# Patient Record
Sex: FEMALE | Race: WHITE | NOT HISPANIC OR LATINO | URBAN - METROPOLITAN AREA
[De-identification: names, ages, dates, MRNs, and addresses within clinical notes are randomized per-mention and may not be internally consistent; named-entity substitution may affect disease eponyms.]

---

## 2018-02-05 ENCOUNTER — MYC MEDICAL ADVICE (OUTPATIENT)
Dept: FAMILY MEDICINE | Facility: CLINIC | Age: 33
End: 2018-02-05

## 2018-02-06 NOTE — TELEPHONE ENCOUNTER
Please see 117go message. Please advise. Thank you.  Cammy Lopez RN, BSN  [facility]

## 2018-12-05 ENCOUNTER — MYC MEDICAL ADVICE (OUTPATIENT)
Dept: FAMILY MEDICINE | Facility: CLINIC | Age: 33
End: 2018-12-05

## 2018-12-06 NOTE — TELEPHONE ENCOUNTER
Please see Shoppilot message chain. OK to order blood type? Please advise. Thank you.  Cammy Lopez RN, BSN  [facility]

## 2019-01-09 NOTE — TELEPHONE ENCOUNTER
Patient called to cancel appointment for 1/10/19, stated has to work. Appointment is for 1:30pm, just short of 24 hour notice. Patient will call back to schedule future appointment after checking work schedule. Wimba message/reply sent. Jessica Chauhan R.N.

## 2019-11-06 ENCOUNTER — HEALTH MAINTENANCE LETTER (OUTPATIENT)
Age: 34
End: 2019-11-06

## 2020-02-26 ENCOUNTER — OFFICE VISIT (OUTPATIENT)
Dept: FAMILY MEDICINE | Facility: CLINIC | Age: 35
End: 2020-02-26
Payer: COMMERCIAL

## 2020-02-26 VITALS
OXYGEN SATURATION: 97 % | DIASTOLIC BLOOD PRESSURE: 70 MMHG | WEIGHT: 208 LBS | SYSTOLIC BLOOD PRESSURE: 116 MMHG | BODY MASS INDEX: 34.66 KG/M2 | TEMPERATURE: 98.8 F | HEART RATE: 99 BPM | HEIGHT: 65 IN

## 2020-02-26 DIAGNOSIS — E66.09 CLASS 2 OBESITY DUE TO EXCESS CALORIES WITHOUT SERIOUS COMORBIDITY WITH BODY MASS INDEX (BMI) OF 35.0 TO 35.9 IN ADULT: ICD-10-CM

## 2020-02-26 DIAGNOSIS — F32.5 DEPRESSION, MAJOR, IN REMISSION (H): ICD-10-CM

## 2020-02-26 DIAGNOSIS — Z00.00 ROUTINE GENERAL MEDICAL EXAMINATION AT A HEALTH CARE FACILITY: Primary | ICD-10-CM

## 2020-02-26 DIAGNOSIS — E66.812 CLASS 2 OBESITY DUE TO EXCESS CALORIES WITHOUT SERIOUS COMORBIDITY WITH BODY MASS INDEX (BMI) OF 35.0 TO 35.9 IN ADULT: ICD-10-CM

## 2020-02-26 PROCEDURE — 90471 IMMUNIZATION ADMIN: CPT | Performed by: FAMILY MEDICINE

## 2020-02-26 PROCEDURE — 99385 PREV VISIT NEW AGE 18-39: CPT | Mod: 25 | Performed by: FAMILY MEDICINE

## 2020-02-26 PROCEDURE — 90715 TDAP VACCINE 7 YRS/> IM: CPT | Performed by: FAMILY MEDICINE

## 2020-02-26 RX ORDER — MULTIPLE VITAMINS W/ MINERALS TAB 9MG-400MCG
1 TAB ORAL DAILY
COMMUNITY

## 2020-02-26 ASSESSMENT — ANXIETY QUESTIONNAIRES
2. NOT BEING ABLE TO STOP OR CONTROL WORRYING: SEVERAL DAYS
6. BECOMING EASILY ANNOYED OR IRRITABLE: NOT AT ALL
3. WORRYING TOO MUCH ABOUT DIFFERENT THINGS: NOT AT ALL
5. BEING SO RESTLESS THAT IT IS HARD TO SIT STILL: NOT AT ALL
1. FEELING NERVOUS, ANXIOUS, OR ON EDGE: NOT AT ALL
GAD7 TOTAL SCORE: 1
IF YOU CHECKED OFF ANY PROBLEMS ON THIS QUESTIONNAIRE, HOW DIFFICULT HAVE THESE PROBLEMS MADE IT FOR YOU TO DO YOUR WORK, TAKE CARE OF THINGS AT HOME, OR GET ALONG WITH OTHER PEOPLE: NOT DIFFICULT AT ALL
7. FEELING AFRAID AS IF SOMETHING AWFUL MIGHT HAPPEN: NOT AT ALL

## 2020-02-26 ASSESSMENT — PATIENT HEALTH QUESTIONNAIRE - PHQ9
5. POOR APPETITE OR OVEREATING: NOT AT ALL
SUM OF ALL RESPONSES TO PHQ QUESTIONS 1-9: 3

## 2020-02-26 ASSESSMENT — MIFFLIN-ST. JEOR: SCORE: 1626.42

## 2020-02-26 NOTE — PROGRESS NOTES
" SUBJECTIVE:   CC: Cammy Patino is an 34 year old woman who presents for preventive health visit.     Healthy Habits:    Do you get at least three servings of calcium containing foods daily (dairy, green leafy vegetables, etc.)? yes    Amount of exercise or daily activities, outside of work: 2-3 day(s) per week    Problems taking medications regularly not applicable    Medication side effects: No    Have you had an eye exam in the past two years? yes    Do you see a dentist twice per year? yes    Do you have sleep apnea, excessive snoring or daytime drowsiness?yes       Depression Followup    How are you doing with your depression since your last visit? Improved - off of medication    Are you having other symptoms that might be associated with depression? No    Have you had a significant life event?  No     Are you feeling anxious or having panic attacks?   No    Do you have any concerns with your use of alcohol or other drugs? No    Social History     Tobacco Use     Smoking status: Never Smoker     Smokeless tobacco: Never Used   Substance Use Topics     Alcohol use: Yes     Comment: \"SLIM\"     Drug use: No     PHQ 2/26/2020   PHQ-9 Total Score 3   Q9: Thoughts of better off dead/self-harm past 2 weeks Not at all     KYLEE-7 SCORE 11/21/2013 2/26/2020   Total Score 14 -   Total Score - 1     Last PHQ-9 2/26/2020   1.  Little interest or pleasure in doing things 0   2.  Feeling down, depressed, or hopeless 0   3.  Trouble falling or staying asleep, or sleeping too much 1   4.  Feeling tired or having little energy 1   5.  Poor appetite or overeating 1   6.  Feeling bad about yourself 0   7.  Trouble concentrating 0   8.  Moving slowly or restless 0   Q9: Thoughts of better off dead/self-harm past 2 weeks 0   PHQ-9 Total Score 3   Difficulty at work, home, or with people Not difficult at all     KYLEE-7  2/26/2020   1. Feeling nervous, anxious, or on edge 0   2. Not being able to stop or control worrying 1   3. " "Worrying too much about different things 0   4. Trouble relaxing 0   5. Being so restless that it is hard to sit still 0   6. Becoming easily annoyed or irritable 0   7. Feeling afraid, as if something awful might happen 0   KYLEE-7 Total Score 1   If you checked any problems, how difficult have they made it for you to do your work, take care of things at home, or get along with other people? Not difficult at all         Suicide Assessment Five-step Evaluation and Treatment (SAFE-T)      Today's PHQ-2 Score:   PHQ-2 ( 1999 Pfizer) 2/26/2020 2/7/2013   Q1: Little interest or pleasure in doing things 0 0   Q2: Feeling down, depressed or hopeless 0 0   PHQ-2 Score 0 0       Abuse: Current or Past(Physical, Sexual or Emotional)- NO  Do you feel safe in your environment? YES        Social History     Tobacco Use     Smoking status: Never Smoker     Smokeless tobacco: Never Used   Substance Use Topics     Alcohol use: Yes     Comment: \"SLIM\"     If you drink alcohol do you typically have >3 drinks per day or >7 drinks per week? No                     Reviewed orders with patient.  Reviewed health maintenance and updated orders accordingly - Yes  Lab work is in process  Labs reviewed in [facility]    Mammogram not appropriate for this patient based on age.    Pertinent mammograms are reviewed under the imaging tab.  History of abnormal Pap smear: NO - age 30-65 PAP every 5 years with negative HPV co-testing recommended  PAP / HPV Latest Ref Rng & Units 6/21/2016 2/7/2013 12/30/2010   PAP - NIL NIL NIL   HPV 16 DNA NEG Negative - -   HPV 18 DNA NEG Negative - -   OTHER HR HPV NEG Negative - -     Reviewed and updated as needed this visit by clinical staff  Tobacco  Allergies  Meds  Problems  Soc Hx        Reviewed and updated as needed this visit by Provider  Problems            ROS:  CONSTITUTIONAL: NEGATIVE for fever, chills, change in weight  INTEGUMENTARU/SKIN: NEGATIVE for worrisome rashes, moles or lesions  EYES: " "NEGATIVE for vision changes or irritation  ENT: NEGATIVE for ear, mouth and throat problems  RESP: NEGATIVE for significant cough or SOB  BREAST: NEGATIVE for masses, tenderness or discharge  CV: NEGATIVE for chest pain, palpitations or peripheral edema  GI: NEGATIVE for nausea, abdominal pain, heartburn, or change in bowel habits  : NEGATIVE for unusual urinary or vaginal symptoms. Periods are regular.  MUSCULOSKELETAL: NEGATIVE for significant arthralgias or myalgia  NEURO: NEGATIVE for weakness, dizziness or paresthesias  PSYCHIATRIC: NEGATIVE for changes in mood or affect    OBJECTIVE:   /70   Pulse 99   Temp 98.8  F (37.1  C) (Oral)   Ht 1.638 m (5' 4.5\")   Wt 94.3 kg (208 lb)   SpO2 97%   BMI 35.15 kg/m    EXAM:  GENERAL: healthy, alert and no distress  EYES: Eyes grossly normal to inspection, PERRL and conjunctivae and sclerae normal  HENT: ear canals and TM's normal, nose and mouth without ulcers or lesions  NECK: no adenopathy, no asymmetry, masses, or scars and thyroid normal to palpation  RESP: lungs clear to auscultation - no rales, rhonchi or wheezes  CV: regular rate and rhythm, normal S1 S2, no S3 or S4, no murmur, click or rub, no peripheral edema and peripheral pulses strong  ABDOMEN: soft, nontender, no hepatosplenomegaly, no masses and bowel sounds normal  MS: no gross musculoskeletal defects noted, no edema  SKIN: no suspicious lesions or rashes  NEURO: Normal strength and tone, mentation intact and speech normal  PSYCH: mentation appears normal, affect normal/bright    Diagnostic Test Results:  Labs reviewed in Epic    ASSESSMENT/PLAN:   1. Routine general medical examination at a health care facility  Updated Tdap.  Declines flu shot.  Haven't check a lipid panel in 7 years so will check this today.  Emphasized importance of healthy lifestyle including regular exercise and lower fat & lower sodium diet.   - TDAP, IM (10 - 64 YRS) - Adacel  - Lipid panel reflex to direct LDL " "Non-fasting    2. Depression, major, in remission (H)  This is stable.  Continue to follow clinically.    3. Class 2 obesity due to excess calories without serious comorbidity with body mass index (BMI) of 35.0 to 35.9 in adult  Given obesity, will screen for diabetes.    - Hemoglobin A1c    COUNSELING:   Reviewed preventive health counseling, as reflected in patient instructions       Immunizations    Declined: Influenza due to Concerns about side effects/safety            Estimated body mass index is 35.15 kg/m  as calculated from the following:    Height as of this encounter: 1.638 m (5' 4.5\").    Weight as of this encounter: 94.3 kg (208 lb).    Weight management plan: Discussed healthy diet and exercise guidelines     reports that she has never smoked. She has never used smokeless tobacco.      Counseling Resources:  ATP IV Guidelines  Pooled Cohorts Equation Calculator  Breast Cancer Risk Calculator  FRAX Risk Assessment  ICSI Preventive Guidelines  Dietary Guidelines for Americans, 2010  USDA's MyPlate  ASA Prophylaxis  Lung CA Screening    Yeison Givens Jr, MD  [facility]  "

## 2020-02-27 ASSESSMENT — ANXIETY QUESTIONNAIRES: GAD7 TOTAL SCORE: 1

## 2020-11-29 ENCOUNTER — HEALTH MAINTENANCE LETTER (OUTPATIENT)
Age: 35
End: 2020-11-29

## 2021-04-10 ENCOUNTER — HEALTH MAINTENANCE LETTER (OUTPATIENT)
Age: 36
End: 2021-04-10

## 2021-09-19 ENCOUNTER — HEALTH MAINTENANCE LETTER (OUTPATIENT)
Age: 36
End: 2021-09-19

## 2022-04-12 ASSESSMENT — ENCOUNTER SYMPTOMS
HEMATOCHEZIA: 0
HEARTBURN: 0
FEVER: 0
FREQUENCY: 0
WEAKNESS: 0
SHORTNESS OF BREATH: 0
SORE THROAT: 0
ARTHRALGIAS: 0
MYALGIAS: 0
ABDOMINAL PAIN: 0
JOINT SWELLING: 0
DIZZINESS: 0
NERVOUS/ANXIOUS: 1
EYE PAIN: 0
CONSTIPATION: 0
DYSURIA: 0
DIARRHEA: 0
PALPITATIONS: 0
HEADACHES: 0
BREAST MASS: 0
COUGH: 0
PARESTHESIAS: 0
NAUSEA: 0
CHILLS: 0
HEMATURIA: 0

## 2022-04-13 ENCOUNTER — OFFICE VISIT (OUTPATIENT)
Dept: FAMILY MEDICINE | Facility: CLINIC | Age: 37
End: 2022-04-13
Payer: COMMERCIAL

## 2022-04-13 VITALS
RESPIRATION RATE: 12 BRPM | BODY MASS INDEX: 35.49 KG/M2 | HEIGHT: 65 IN | HEART RATE: 97 BPM | OXYGEN SATURATION: 98 % | WEIGHT: 213 LBS | DIASTOLIC BLOOD PRESSURE: 78 MMHG | TEMPERATURE: 98.3 F | SYSTOLIC BLOOD PRESSURE: 118 MMHG

## 2022-04-13 DIAGNOSIS — Z11.59 NEED FOR HEPATITIS C SCREENING TEST: ICD-10-CM

## 2022-04-13 DIAGNOSIS — Z00.00 ENCOUNTER FOR ROUTINE ADULT HEALTH EXAMINATION WITHOUT ABNORMAL FINDINGS: Primary | ICD-10-CM

## 2022-04-13 DIAGNOSIS — E66.01 MORBID OBESITY (H): ICD-10-CM

## 2022-04-13 DIAGNOSIS — N92.6 IRREGULAR MENSES: ICD-10-CM

## 2022-04-13 DIAGNOSIS — B96.89 BACTERIAL VAGINOSIS: ICD-10-CM

## 2022-04-13 DIAGNOSIS — E78.2 ELEVATED TRIGLYCERIDES WITH HIGH CHOLESTEROL: ICD-10-CM

## 2022-04-13 DIAGNOSIS — F32.5 DEPRESSION, MAJOR, IN REMISSION (H): ICD-10-CM

## 2022-04-13 DIAGNOSIS — N76.0 BACTERIAL VAGINOSIS: ICD-10-CM

## 2022-04-13 DIAGNOSIS — Z12.4 CERVICAL CANCER SCREENING: ICD-10-CM

## 2022-04-13 PROBLEM — E66.09 CLASS 2 OBESITY DUE TO EXCESS CALORIES WITHOUT SERIOUS COMORBIDITY WITH BODY MASS INDEX (BMI) OF 35.0 TO 35.9 IN ADULT: Status: RESOLVED | Noted: 2020-02-26 | Resolved: 2022-04-13

## 2022-04-13 PROBLEM — E66.812 CLASS 2 OBESITY DUE TO EXCESS CALORIES WITHOUT SERIOUS COMORBIDITY WITH BODY MASS INDEX (BMI) OF 35.0 TO 35.9 IN ADULT: Status: RESOLVED | Noted: 2020-02-26 | Resolved: 2022-04-13

## 2022-04-13 LAB
BASOPHILS # BLD AUTO: 0.1 10E3/UL (ref 0–0.2)
BASOPHILS NFR BLD AUTO: 1 %
CHOLEST SERPL-MCNC: 235 MG/DL
CLUE CELLS: PRESENT
EOSINOPHIL # BLD AUTO: 0.6 10E3/UL (ref 0–0.7)
EOSINOPHIL NFR BLD AUTO: 6 %
ERYTHROCYTE [DISTWIDTH] IN BLOOD BY AUTOMATED COUNT: 12.3 % (ref 10–15)
FASTING STATUS PATIENT QL REPORTED: YES
FASTING STATUS PATIENT QL REPORTED: YES
FSH SERPL-ACNC: 17 IU/L
GLUCOSE BLD-MCNC: 89 MG/DL (ref 70–99)
HCT VFR BLD AUTO: 42.4 % (ref 35–47)
HDLC SERPL-MCNC: 34 MG/DL
HGB BLD-MCNC: 14.1 G/DL (ref 11.7–15.7)
IMM GRANULOCYTES # BLD: 0 10E3/UL
IMM GRANULOCYTES NFR BLD: 0 %
LDLC SERPL CALC-MCNC: 137 MG/DL
LH SERPL-ACNC: 25.2 IU/L
LYMPHOCYTES # BLD AUTO: 2.8 10E3/UL (ref 0.8–5.3)
LYMPHOCYTES NFR BLD AUTO: 30 %
MCH RBC QN AUTO: 29.4 PG (ref 26.5–33)
MCHC RBC AUTO-ENTMCNC: 33.3 G/DL (ref 31.5–36.5)
MCV RBC AUTO: 88 FL (ref 78–100)
MONOCYTES # BLD AUTO: 0.6 10E3/UL (ref 0–1.3)
MONOCYTES NFR BLD AUTO: 6 %
NEUTROPHILS # BLD AUTO: 5.5 10E3/UL (ref 1.6–8.3)
NEUTROPHILS NFR BLD AUTO: 58 %
NONHDLC SERPL-MCNC: 201 MG/DL
PLATELET # BLD AUTO: 303 10E3/UL (ref 150–450)
RBC # BLD AUTO: 4.8 10E6/UL (ref 3.8–5.2)
TRICHOMONAS, WET PREP: ABNORMAL
TRIGL SERPL-MCNC: 321 MG/DL
TSH SERPL DL<=0.005 MIU/L-ACNC: 1.67 MU/L (ref 0.4–4)
WBC # BLD AUTO: 9.6 10E3/UL (ref 4–11)
WBC'S/HIGH POWER FIELD, WET PREP: ABNORMAL
YEAST, WET PREP: ABNORMAL

## 2022-04-13 PROCEDURE — 87210 SMEAR WET MOUNT SALINE/INK: CPT | Performed by: FAMILY MEDICINE

## 2022-04-13 PROCEDURE — 80076 HEPATIC FUNCTION PANEL: CPT | Performed by: FAMILY MEDICINE

## 2022-04-13 PROCEDURE — 86803 HEPATITIS C AB TEST: CPT | Performed by: FAMILY MEDICINE

## 2022-04-13 PROCEDURE — 99214 OFFICE O/P EST MOD 30 MIN: CPT | Mod: 25 | Performed by: FAMILY MEDICINE

## 2022-04-13 PROCEDURE — 80061 LIPID PANEL: CPT | Performed by: FAMILY MEDICINE

## 2022-04-13 PROCEDURE — 85025 COMPLETE CBC W/AUTO DIFF WBC: CPT | Performed by: FAMILY MEDICINE

## 2022-04-13 PROCEDURE — 83001 ASSAY OF GONADOTROPIN (FSH): CPT | Performed by: FAMILY MEDICINE

## 2022-04-13 PROCEDURE — 82947 ASSAY GLUCOSE BLOOD QUANT: CPT | Performed by: FAMILY MEDICINE

## 2022-04-13 PROCEDURE — 83002 ASSAY OF GONADOTROPIN (LH): CPT | Performed by: FAMILY MEDICINE

## 2022-04-13 PROCEDURE — 99395 PREV VISIT EST AGE 18-39: CPT | Performed by: FAMILY MEDICINE

## 2022-04-13 PROCEDURE — G0145 SCR C/V CYTO,THINLAYER,RESCR: HCPCS | Performed by: FAMILY MEDICINE

## 2022-04-13 PROCEDURE — 36415 COLL VENOUS BLD VENIPUNCTURE: CPT | Performed by: FAMILY MEDICINE

## 2022-04-13 PROCEDURE — 84443 ASSAY THYROID STIM HORMONE: CPT | Performed by: FAMILY MEDICINE

## 2022-04-13 PROCEDURE — 87624 HPV HI-RISK TYP POOLED RSLT: CPT | Performed by: FAMILY MEDICINE

## 2022-04-13 RX ORDER — METRONIDAZOLE 500 MG/1
500 TABLET ORAL 2 TIMES DAILY
Qty: 14 TABLET | Refills: 0 | Status: SHIPPED | OUTPATIENT
Start: 2022-04-13 | End: 2022-04-20

## 2022-04-13 RX ORDER — ATORVASTATIN CALCIUM 20 MG/1
20 TABLET, FILM COATED ORAL DAILY
Qty: 90 TABLET | Refills: 3 | Status: SHIPPED | OUTPATIENT
Start: 2022-04-13 | End: 2023-06-05

## 2022-04-13 ASSESSMENT — ANXIETY QUESTIONNAIRES
IF YOU CHECKED OFF ANY PROBLEMS ON THIS QUESTIONNAIRE, HOW DIFFICULT HAVE THESE PROBLEMS MADE IT FOR YOU TO DO YOUR WORK, TAKE CARE OF THINGS AT HOME, OR GET ALONG WITH OTHER PEOPLE: NOT DIFFICULT AT ALL
7. FEELING AFRAID AS IF SOMETHING AWFUL MIGHT HAPPEN: NOT AT ALL
2. NOT BEING ABLE TO STOP OR CONTROL WORRYING: SEVERAL DAYS
1. FEELING NERVOUS, ANXIOUS, OR ON EDGE: SEVERAL DAYS
3. WORRYING TOO MUCH ABOUT DIFFERENT THINGS: SEVERAL DAYS
5. BEING SO RESTLESS THAT IT IS HARD TO SIT STILL: NOT AT ALL
6. BECOMING EASILY ANNOYED OR IRRITABLE: SEVERAL DAYS
GAD7 TOTAL SCORE: 5

## 2022-04-13 ASSESSMENT — PAIN SCALES - GENERAL: PAINLEVEL: NO PAIN (0)

## 2022-04-13 ASSESSMENT — ENCOUNTER SYMPTOMS
HEMATURIA: 0
SHORTNESS OF BREATH: 0
CONSTIPATION: 0
WEAKNESS: 0
FEVER: 0
BREAST MASS: 0
COUGH: 0
MYALGIAS: 0
FREQUENCY: 0
DIARRHEA: 0
DIZZINESS: 0
JOINT SWELLING: 0
NERVOUS/ANXIOUS: 1
ARTHRALGIAS: 0
HEARTBURN: 0
PARESTHESIAS: 0
PALPITATIONS: 0
CHILLS: 0
HEMATOCHEZIA: 0
HEADACHES: 0
NAUSEA: 0
SORE THROAT: 0
DYSURIA: 0
ABDOMINAL PAIN: 0
EYE PAIN: 0

## 2022-04-13 ASSESSMENT — PATIENT HEALTH QUESTIONNAIRE - PHQ9
5. POOR APPETITE OR OVEREATING: SEVERAL DAYS
SUM OF ALL RESPONSES TO PHQ QUESTIONS 1-9: 3

## 2022-04-13 NOTE — PROGRESS NOTES
"Flagyl    SUBJECTIVE:   CC: Cammy Patino is an 36 year old woman who presents for preventive health visit.       Patient has been advised of split billing requirements and indicates understanding: Yes  Healthy Habits:     Getting at least 3 servings of Calcium per day:  Yes    Bi-annual eye exam:  Yes    Dental care twice a year:  Yes    Sleep apnea or symptoms of sleep apnea:  Daytime drowsiness    Diet:  Regular (no restrictions)    Duration of exercise:  15-30 minutes    Taking medications regularly:  Not Applicable    Medication side effects:  Not applicable    PHQ-2 Total Score: 0    Additional concerns today:  Yes    Irregular Periods - becoming more unpredictable.  Used to be 13-14th every month to start, lasting 3 days or so only one of which was remotely heavy.   Now she's spotting in between periods at times and now has heavier (2-3 super absorbant pads in 12 hour period) days more frequently.  Heavy flow lasts 24 hours or so and the she spots for about a week.  This seems to have all started about year ago shortly following 2nd COVID shot. Both Cammy and her  Maulik note a change in her body odor as well.        Today's PHQ-2 Score:   PHQ-2 ( 1999 Pfizer) 4/12/2022   Q1: Little interest or pleasure in doing things 0   Q2: Feeling down, depressed or hopeless 0   PHQ-2 Score 0   PHQ-2 Total Score (12-17 Years)- Positive if 3 or more points; Administer PHQ-A if positive -   Q1: Little interest or pleasure in doing things Not at all   Q2: Feeling down, depressed or hopeless Not at all   PHQ-2 Score 0       Abuse: Current or Past (Physical, Sexual or Emotional) - NO  Do you feel safe in your environment? YES      Social History     Tobacco Use     Smoking status: Never Smoker     Smokeless tobacco: Never Used   Substance Use Topics     Alcohol use: Yes     Comment: \"SLIM\"     If you drink alcohol do you typically have >3 drinks per day or >7 drinks per week? No    Alcohol Use 4/13/2022   Prescreen: >3 " "drinks/day or >7 drinks/week? -   Prescreen: >3 drinks/day or >7 drinks/week? No       Reviewed orders with patient.  Reviewed health maintenance and updated orders accordingly - Yes      Breast Cancer Screening:    Breast CA Risk Assessment (FHS-7) 4/12/2022   Do you have a family history of breast, colon, or ovarian cancer? No / Unknown           Pertinent mammograms are reviewed under the imaging tab.    History of abnormal Pap smear: NO - age 30-65 PAP every 5 years with negative HPV co-testing recommended  PAP / HPV Latest Ref Rng & Units 6/21/2016 2/7/2013 12/30/2010   PAP (Historical) - NIL NIL NIL   HPV16 NEG Negative - -   HPV18 NEG Negative - -   HRHPV NEG Negative - -     Reviewed and updated as needed this visit by clinical staff   Tobacco  Allergies  Meds                Reviewed and updated as needed this visit by Provider                       Review of Systems   Constitutional: Negative for chills and fever.   HENT: Negative for congestion, ear pain, hearing loss and sore throat.    Eyes: Negative for pain and visual disturbance.   Respiratory: Negative for cough and shortness of breath.    Cardiovascular: Negative for chest pain, palpitations and peripheral edema.   Gastrointestinal: Negative for abdominal pain, constipation, diarrhea, heartburn, hematochezia and nausea.   Breasts:  Positive for tenderness. Negative for breast mass and discharge.   Genitourinary: Negative for dysuria, frequency, genital sores, hematuria, pelvic pain, urgency, vaginal bleeding and vaginal discharge.   Musculoskeletal: Negative for arthralgias, joint swelling and myalgias.   Skin: Negative for rash.   Neurological: Negative for dizziness, weakness, headaches and paresthesias.   Psychiatric/Behavioral: Negative for mood changes. The patient is nervous/anxious.           OBJECTIVE:   /78   Pulse 97   Temp 98.3  F (36.8  C) (Tympanic)   Resp 12   Ht 1.638 m (5' 4.5\")   Wt 96.6 kg (213 lb)   SpO2 98%   BMI " 36.00 kg/m     Patient was offered chaperone for her breast/pelvic exam today.  She declined this resource.   Physical Exam  GENERAL: healthy, alert and no distress  EYES: Eyes grossly normal to inspection, PERRL and conjunctivae and sclerae normal  HENT: ear canals and TM's normal, nose and mouth without ulcers or lesions  NECK: no adenopathy, no asymmetry, masses, or scars and thyroid normal to palpation  RESP: lungs clear to auscultation - no rales, rhonchi or wheezes  BREAST: normal without masses, tenderness or nipple discharge and no palpable axillary masses or adenopathy  CV: regular rate and rhythm, normal S1 S2, no S3 or S4, no murmur, click or rub, no peripheral edema and peripheral pulses strong  ABDOMEN: soft, nontender, no hepatosplenomegaly, no masses and bowel sounds normal   (female): normal female external genitalia, normal urethral meatus, vaginal mucosa pink, moist, well rugated, and normal cervix/adnexa/uterus without masses or discharge  MS: no gross musculoskeletal defects noted, no edema  SKIN: no suspicious lesions or rashes  NEURO: Normal strength and tone, mentation intact and speech normal  PSYCH: mentation appears normal, affect normal/bright    Diagnostic Test Results:  Labs reviewed in Epic  Results for orders placed or performed in visit on 04/13/22 (from the past 24 hour(s))   TSH with free T4 reflex   Result Value Ref Range    TSH 1.67 0.40 - 4.00 mU/L   Follicle stimulating hormone   Result Value Ref Range    FSH 17.0 IU/L   Luteinizing Hormone, Adult   Result Value Ref Range    Lutropin 25.2 IU/L   CBC with platelets and differential    Narrative    The following orders were created for panel order CBC with platelets and differential.  Procedure                               Abnormality         Status                     ---------                               -----------         ------                     CBC with platelets and d...[[id number]]                      Final result                  Please view results for these tests on the individual orders.   Lipid panel reflex to direct LDL Non-fasting   Result Value Ref Range    Cholesterol 235 (H) <200 mg/dL    Triglycerides 321 (H) <150 mg/dL    Direct Measure HDL 34 (L) >=50 mg/dL    LDL Cholesterol Calculated 137 (H) <=100 mg/dL    Non HDL Cholesterol 201 (H) <130 mg/dL    Patient Fasting > 8hrs? Yes     Narrative    Cholesterol  Desirable:  <200 mg/dL    Triglycerides  Normal:  Less than 150 mg/dL  Borderline High:  150-199 mg/dL  High:  200-499 mg/dL  Very High:  Greater than or equal to 500 mg/dL    Direct Measure HDL  Female:  Greater than or equal to 50 mg/dL   Male:  Greater than or equal to 40 mg/dL    LDL Cholesterol  Desirable:  <100mg/dL  Above Desirable:  100-129 mg/dL   Borderline High:  130-159 mg/dL   High:  160-189 mg/dL   Very High:  >= 190 mg/dL    Non HDL Cholesterol  Desirable:  130 mg/dL  Above Desirable:  130-159 mg/dL  Borderline High:  160-189 mg/dL  High:  190-219 mg/dL  Very High:  Greater than or equal to 220 mg/dL   Glucose   Result Value Ref Range    Glucose 89 70 - 99 mg/dL    Patient Fasting > 8hrs? Yes    CBC with platelets and differential   Result Value Ref Range    WBC Count 9.6 4.0 - 11.0 10e3/uL    RBC Count 4.80 3.80 - 5.20 10e6/uL    Hemoglobin 14.1 11.7 - 15.7 g/dL    Hematocrit 42.4 35.0 - 47.0 %    MCV 88 78 - 100 fL    MCH 29.4 26.5 - 33.0 pg    MCHC 33.3 31.5 - 36.5 g/dL    RDW 12.3 10.0 - 15.0 %    Platelet Count 303 150 - 450 10e3/uL    % Neutrophils 58 %    % Lymphocytes 30 %    % Monocytes 6 %    % Eosinophils 6 %    % Basophils 1 %    % Immature Granulocytes 0 %    Absolute Neutrophils 5.5 1.6 - 8.3 10e3/uL    Absolute Lymphocytes 2.8 0.8 - 5.3 10e3/uL    Absolute Monocytes 0.6 0.0 - 1.3 10e3/uL    Absolute Eosinophils 0.6 0.0 - 0.7 10e3/uL    Absolute Basophils 0.1 0.0 - 0.2 10e3/uL    Absolute Immature Granulocytes 0.0 <=0.4 10e3/uL   Wet prep - lab collect    Specimen: Vagina; Swab    Result Value Ref Range    Trichomonas Absent Absent    Yeast Absent Absent    Clue Cells Present (A) Absent    WBCs/high power field None None       ASSESSMENT/PLAN:     Problem List Items Addressed This Visit     Depression, major, in remission (H)     Continues to be well controlled off of medication.  Continue to follow clinically.           Elevated triglycerides with high cholesterol     New onset and linked to morbid obesity.  Will begin lower dose statin - atorvastatin 20 mg daily.  Recheck in 3 months.  Referred to 24 week weight loss program.           Relevant Orders    Comprehensive Weight Management    Morbid obesity (H)     New finding today associated with triglycerides elevated over 350.  Her elevated triglycerides are clearly linked to obesity and she would benefit from weight loss.  Briefly discussed W[place]ovy today as something we could consider.  I've referred Cammy to the 24 week weight loss program.  Consider augmenting this with medical therapy as well.  Recheck in 3-6 months.           Relevant Orders    Lipid panel reflex to direct LDL Non-fasting (Completed)    Glucose (Completed)    Comprehensive Weight Management      Other Visit Diagnoses     Encounter for routine adult health examination without abnormal findings    -  Primary    Irregular menses        Relevant Orders    TSH with free T4 reflex (Completed)    Follicle stimulating hormone (Completed)    Luteinizing Hormone, Adult (Completed)    US Pelvic Complete with Transvaginal    CBC with platelets and differential (Completed)    Wet prep - lab collect (Completed)    Need for hepatitis C screening test        Relevant Orders    Hepatitis C Screen Reflex to HCV RNA Quant and Genotype    Cervical cancer screening        Relevant Orders    Pap Screen with HPV - recommended age 30 - 65 years    Bacterial vaginosis        Relevant Medications    metroNIDAZOLE (FLAGYL) 500 MG tablet        Workup for irregular menses shows bacterial  "vaginosis - will treat with metronidazole.  This may be the source of the altered smell?  FSH & LH shows her to be mid-cycle for both. TSH is normal.  Await  pelvic ultrasound results.  If this is normal, consider cycling for three months with oral contraceptive pills.       Discussed CDC recommendation for one time hepatitis C screening for people born between 0445-3696 that has now expanded to include all adults.  Discussed risk factors for hepatitis C including blood transfusion and IV drug use.               COUNSELING:  Reviewed preventive health counseling, as reflected in patient instructions       Regular exercise       Healthy diet/nutrition    Estimated body mass index is 36 kg/m  as calculated from the following:    Height as of this encounter: 1.638 m (5' 4.5\").    Weight as of this encounter: 96.6 kg (213 lb).    Weight management plan: Patient referred to endocrine and/or weight management specialty    She reports that she has never smoked. She has never used smokeless tobacco.      Counseling Resources:  ATP IV Guidelines  Pooled Cohorts Equation Calculator  Breast Cancer Risk Calculator  BRCA-Related Cancer Risk Assessment: FHS-7 Tool  FRAX Risk Assessment  ICSI Preventive Guidelines  Dietary Guidelines for Americans, 2010  USDA's MyPlate  ASA Prophylaxis  Lung CA Screening    Yeison Givens Jr, MD  [facility]  "

## 2022-04-14 LAB
ALBUMIN SERPL-MCNC: 4.2 G/DL (ref 3.4–5)
ALP SERPL-CCNC: 88 U/L (ref 40–150)
ALT SERPL W P-5'-P-CCNC: 33 U/L (ref 0–50)
AST SERPL W P-5'-P-CCNC: 18 U/L (ref 0–45)
BILIRUB DIRECT SERPL-MCNC: <0.1 MG/DL (ref 0–0.2)
BILIRUB SERPL-MCNC: 0.5 MG/DL (ref 0.2–1.3)
HCV AB SERPL QL IA: NONREACTIVE
PROT SERPL-MCNC: 7.7 G/DL (ref 6.8–8.8)

## 2022-04-14 ASSESSMENT — ANXIETY QUESTIONNAIRES: GAD7 TOTAL SCORE: 5

## 2022-04-14 NOTE — ASSESSMENT & PLAN NOTE
New finding today associated with triglycerides elevated over 350.  Her elevated triglycerides are clearly linked to obesity and she would benefit from weight loss.  Briefly discussed Sulema today as something we could consider.  I've referred Cammy to the 24 week weight loss program.  Consider augmenting this with medical therapy as well.  Recheck in 3-6 months.

## 2022-04-14 NOTE — RESULT ENCOUNTER NOTE
Ms. Patino,    -Liver and gallbladder tests (ALT,AST, Alk phos,bilirubin) are normal.    If you have further questions about the interpretation of your labs, [email or web address] is a good website to check out for further information.    Please contact the clinic if you have additional questions.  Thank you.    Sincerely,    Yeison Givens MD

## 2022-04-14 NOTE — ASSESSMENT & PLAN NOTE
New onset and linked to morbid obesity.  Will begin lower dose statin - atorvastatin 20 mg daily.  Recheck in 3 months.  Referred to 24 week weight loss program.

## 2022-04-14 NOTE — RESULT ENCOUNTER NOTE
Ms. Patino,    -Hepatitis C antibody screen test shows no signs of a previous hepatitis C infection.    If you have further questions about the interpretation of your labs, [email or web address] is a good website to check out for further information.    Please contact the clinic if you have additional questions.  Thank you.    Sincerely,    Yeison Givens MD

## 2022-04-14 NOTE — RESULT ENCOUNTER NOTE
Ms. Patino,    -Normal red blood cell (hgb) levels, normal white blood cell count and normal platelet levels.  -HDL(good) cholesterol level is low and your triglycerides are elevated which can increase your heart disease risk.  A diet high in fat and simple carbohydrates, genetics and being overweight can contribute to this. LDL(bad) cholesterol level is normal.  ADVISE:exercising 150 minutes of aerobic exercise per week (30 minutes 5 days per week or 50 minutes 3 days per week are options), and atorvastatin 20 mg daily are helpful to improve this.  Because your triglycerides are over 300 Cammy, we need to treat this with prescription medication.  I've sent this to [facility] for you.  In 3 months, you should recheck your fasting cholesterol panel by scheduling a lab-only appointment.  In addition, I've placed a referral to an intensive 24 week weight loss program at our [place] office.  Weight loss will go a long way in treating this problem, Cammy.  With enough weight loss, I'm optimistic we can get you off the atorvastatin.  Also please recall the Sulema weight loss medication discussion we had today.  I think that is something we should consider and I'd encourage you to talk to the folks in the weight loss program about it.  It's a weekly injection you give yourself, but I've really seen it work.  -Glucose (diabetic screening test) is normal.  -TSH (thyroid stimulating hormone) level is normal which indicates normal thyroid function.  -Two hormone tests (LH & FSH) indicate that you're mid-cycle.  This is normal.    If you have further questions about the interpretation of your labs, [email or web address] is a good website to check out for further information.    Please contact the clinic if you have additional questions.  Thank you and marvelous to see you today.    Sincerely,    Yeison Givens MD

## 2022-04-18 ENCOUNTER — ANCILLARY PROCEDURE (OUTPATIENT)
Dept: ULTRASOUND IMAGING | Facility: CLINIC | Age: 37
End: 2022-04-18
Attending: FAMILY MEDICINE
Payer: COMMERCIAL

## 2022-04-18 DIAGNOSIS — N93.9 ABNORMAL UTERINE BLEEDING: Primary | ICD-10-CM

## 2022-04-18 DIAGNOSIS — N92.6 IRREGULAR MENSES: ICD-10-CM

## 2022-04-18 LAB
BKR LAB AP GYN ADEQUACY: NORMAL
BKR LAB AP GYN INTERPRETATION: NORMAL
BKR LAB AP HPV REFLEX: NORMAL
BKR LAB AP PREVIOUS ABNORMAL: NORMAL
PATH REPORT.COMMENTS IMP SPEC: NORMAL
PATH REPORT.COMMENTS IMP SPEC: NORMAL
PATH REPORT.RELEVANT HX SPEC: NORMAL

## 2022-04-18 PROCEDURE — 76830 TRANSVAGINAL US NON-OB: CPT | Performed by: RADIOLOGY

## 2022-04-18 PROCEDURE — 76856 US EXAM PELVIC COMPLETE: CPT | Performed by: RADIOLOGY

## 2022-04-18 RX ORDER — DESOGESTREL AND ETHINYL ESTRADIOL 0.15-0.03
1 KIT ORAL DAILY
Qty: 84 TABLET | Refills: 0 | Status: SHIPPED | OUTPATIENT
Start: 2022-04-18 | End: 2023-05-25

## 2022-04-19 LAB
HUMAN PAPILLOMA VIRUS 16 DNA: NEGATIVE
HUMAN PAPILLOMA VIRUS 18 DNA: NEGATIVE
HUMAN PAPILLOMA VIRUS FINAL DIAGNOSIS: NORMAL
HUMAN PAPILLOMA VIRUS OTHER HR: NEGATIVE

## 2022-04-19 NOTE — RESULT ENCOUNTER NOTE
Ms. Patino,    Your ultrasound is nearly normal, but does show a possible fibroid.  This could be contributing to some of the abnormal bleeding you've been having but also could simply be an incidental finding.  Let's cycle your uterine lining with oral contraceptive pills for three months to see if that helps things.  Please take a home pregnancy test prior to starting your pills just to cover our bases and ensure you're not pregnant despite your tubes being tied.  If this fails to improve things Cammy, let's have you see Dr. Dao for a consultation on the next step we should be thinking about in managing this.    If you have further questions about the interpretation of your labs, [email or web address] is a good website to check out for further information.    Please contact the clinic if you have additional questions.  Thank you.    Sincerely,    Yeison Givens MD

## 2022-11-21 ENCOUNTER — HEALTH MAINTENANCE LETTER (OUTPATIENT)
Age: 37
End: 2022-11-21

## 2023-04-20 ENCOUNTER — PATIENT OUTREACH (OUTPATIENT)
Dept: CARE COORDINATION | Facility: CLINIC | Age: 38
End: 2023-04-20

## 2023-05-25 ENCOUNTER — OFFICE VISIT (OUTPATIENT)
Dept: OBGYN | Facility: OTHER | Age: 38
End: 2023-05-25
Payer: COMMERCIAL

## 2023-05-25 VITALS
DIASTOLIC BLOOD PRESSURE: 96 MMHG | HEIGHT: 65 IN | BODY MASS INDEX: 36.75 KG/M2 | WEIGHT: 220.6 LBS | SYSTOLIC BLOOD PRESSURE: 147 MMHG

## 2023-05-25 DIAGNOSIS — E28.2 PCOS (POLYCYSTIC OVARIAN SYNDROME): ICD-10-CM

## 2023-05-25 DIAGNOSIS — Z00.00 ANNUAL PHYSICAL EXAM: Primary | ICD-10-CM

## 2023-05-25 DIAGNOSIS — N84.0 ENDOMETRIAL POLYP: ICD-10-CM

## 2023-05-25 LAB
ERYTHROCYTE [DISTWIDTH] IN BLOOD BY AUTOMATED COUNT: 12.4 % (ref 10–15)
HCT VFR BLD AUTO: 41 % (ref 35–47)
HGB BLD-MCNC: 13.6 G/DL (ref 11.7–15.7)
MCH RBC QN AUTO: 30 PG (ref 26.5–33)
MCHC RBC AUTO-ENTMCNC: 33.2 G/DL (ref 31.5–36.5)
MCV RBC AUTO: 91 FL (ref 78–100)
PLATELET # BLD AUTO: 268 10E3/UL (ref 150–450)
RBC # BLD AUTO: 4.53 10E6/UL (ref 3.8–5.2)
TSH SERPL DL<=0.005 MIU/L-ACNC: 1.44 UIU/ML (ref 0.3–4.2)
WBC # BLD AUTO: 10.3 10E3/UL (ref 4–11)

## 2023-05-25 PROCEDURE — 36415 COLL VENOUS BLD VENIPUNCTURE: CPT | Performed by: OBSTETRICS & GYNECOLOGY

## 2023-05-25 PROCEDURE — 84443 ASSAY THYROID STIM HORMONE: CPT | Performed by: OBSTETRICS & GYNECOLOGY

## 2023-05-25 PROCEDURE — 85027 COMPLETE CBC AUTOMATED: CPT | Performed by: OBSTETRICS & GYNECOLOGY

## 2023-05-25 PROCEDURE — 99385 PREV VISIT NEW AGE 18-39: CPT | Performed by: OBSTETRICS & GYNECOLOGY

## 2023-05-25 ASSESSMENT — PATIENT HEALTH QUESTIONNAIRE - PHQ9
SUM OF ALL RESPONSES TO PHQ QUESTIONS 1-9: 8
SUM OF ALL RESPONSES TO PHQ QUESTIONS 1-9: 8
10. IF YOU CHECKED OFF ANY PROBLEMS, HOW DIFFICULT HAVE THESE PROBLEMS MADE IT FOR YOU TO DO YOUR WORK, TAKE CARE OF THINGS AT HOME, OR GET ALONG WITH OTHER PEOPLE: SOMEWHAT DIFFICULT

## 2023-05-25 NOTE — PATIENT INSTRUCTIONS
PREVENTIVE HEALTH RECOMMENDATIONS:   Get a physical every year.  A pap test is important to have done starting at age 21 and then every three years as long as your pap is normal.  When you receive the results of your pap test it will include when you need to have your next pap test.    You should be tested each year for chlamydia and gonorrhea if you are aged 16-25 and if you have had a new sexual partner since you were last tested.   Vaccines: Get a flu shot each year.   Eat at least 8-10 servings of fruits and vegetables daily.  Eat whole-grain bread and cereal, whole-wheat pasta and brown rice instead of white grains and white rice.   For bone health: Eat calcium-rich foods (dairy products) or take calcium pills (500 to 600 mg with vitamin D) twice a day with food.   Exercise for an average of 30 minutes a day, 5 days of the week. It can be as simple as taking a brisk walk.  This will help you control your weight and prevent many diseases.   Limit alcohol to one drink per day.   Don't smoke and limit your exposure to second hand smoke.  If you smoke consider making a plan to quit. Go to [employer] and clink on   [employer]  for help   Wear sunscreen with at least SPF15 to prevent skin damage and skin cancer.   Brush your teeth twice a day and floss once a day and see your dentist twice a year for an exam and cleaning.   Have a great year and I will look forward to seeing you next year.   Argelia Garland DO    If you have labs or imaging done, the results will automatically release in [employer] without an interpretation.  Your health care professional will review those results and send an interpretation with recommendations as soon as possible, but this may be 1-3 business days.    If you have any questions regarding your visit, please contact your care team.     [email or web address] [facility]: [phone]  Women s Health CLINIC HOURS TELEPHONE NUMBER       DO Nayeli Scott   EUGENE Edward-TAM Keen-TAM Camargo-TAM Jurado-  Amy-     Monday- [place]  8:00 a.m - 5:00 p.m    Tuesday- [place]  8:00 a.m - 5:00 p.m    Wednesday- OFF    Thursday- Surgery     Friday- [place]  8:00 a.m - 5:00 p.m. [facility]  [address]  [place] [address]  [phone] Fax  Imaging [phone]    [facility]  [address] [place] [address] [place] [address]  [phone]    [address] [place] [address]  [phone] Fax  Imaging [phone]     Urgent Care locations:  [facility] Monday-Friday  10 am - 8 pm  Saturday and Sunday   9 am - 5 pm  Monday-Friday   10 am - 8 pm  Saturday and Sunday   9 am - 5 pm   [phone]     If you need a medication refill, please contact your pharmacy. Please allow 3 business days for your refill to be completed.    As always, thank you for trusting us with your healthcare needs!

## 2023-05-25 NOTE — PROGRESS NOTES
"Chief Complaint   Patient presents with     Physical       Subjective  Cammy Patino is a 37 year old female who presents for her annual exam.  Patient states her menses are very irregular.  They always have been.  She bleeds for 1 1/2 days.  Some clots.  No dizziness.  Her last menstrual period is today.  She has been told she has PCOS.  No problems urinating.  Normal bowel movements.  Patient is sexually active.  No dyspareunia.  No vaginal spotting.  2 NSVDs.  Patient has had a tubal ligation.      Most recent pap: 2022  History of abnormal Pap smear:  No  History of STI's:   No  History of PID:   No    Family history of uterine cancer:   No  Family history of ovarian cancer:  No  Family history of colon cancer:  No  Family history of breast cancer:  No    ROS  ROS: 10 point ROS neg other than the symptoms noted above in the HPI.    Past Medical History:   Diagnosis Date     Depression      Obesity      Polycystic ovarian syndrome      Past Surgical History:   Procedure Laterality Date     LAPAROSCOPIC SALPINGECTOMY Bilateral 7/19/2016    Procedure: LAPAROSCOPIC SALPINGECTOMY;  Surgeon: Andreas Dao MD;  Location: [facility] OR     REMOVE INTRAUTERINE DEVICE N/A 7/19/2016    Procedure: REMOVE INTRAUTERINE DEVICE;  Surgeon: Andreas Dao MD;  Location: [facility] OR     Family History   Problem Relation Age of Onset     Lipids Mother      Hypertension Mother      Thyroid Disease Mother      Lipids Father      Dementia Father      Musculoskeletal Disorder Maternal Grandmother         FIBROMYALGIA     Social History     Tobacco Use     Smoking status: Never     Smokeless tobacco: Never   Vaping Use     Vaping status: Never Used   Substance Use Topics     Alcohol use: Yes     Comment: \"SLIM\"       Tobacco abuse:  No  Do you get at least three servings of calcium containing foods daily (dairy, green leafy vegetables, etc.)? yes   Outside of work or daily activities, how many days per week do you exercise for 30 minutes or " "longer? 3-4x per week  The patient does not drink >3 drinks per day nor >7 drinks per week.   Have you had an eye exam in the past two years? yes   Do you see a dentist twice per year? yes   Today's PHQ-2 Score:   Abuse: Current or Past(Physical, Sexual or Emotional)- No   Do you feel safe in your environment - Yes  Objective  Vitals: BP (!) 147/96 (BP Location: Right arm, Cuff Size: Adult Regular)   Ht 1.638 m (5' 4.5\")   Wt 100.1 kg (220 lb 9.6 oz)   LMP 05/25/2023   BMI 37.28 kg/m    BMI= Body mass index is 37.28 kg/m .    General appearance=well developed, well-nourished female  Gait=normal  Psych=mood is stable, alert and oriented x3  HEENT=mucous membranes moist  Skin=no rashes or lesions seen,normal turgor   Breast:  Benign exam.  No masses noted.  Non tender. No skin changes, retraction, or nipple discharge.  Axilla feel completely normal, no lymph node enlargement and non-tender.  Neck=overall appearance is normal  Heart=RRR, no murmurs, no swelling noted  Thyroid=normal, no masses, no TTP, no enlargement  Lungs=non-labored breathing, no use of accessory muscles, clear to ausculation bilaterally  Abd=soft, Nontender/nondistended, +bowel sounds x4, no masses, no signs of hernias, no evidence of hepatosplenomegaly  PELVIC:    External genitalia: normal without lesions or masses  Urethral meatus: no lesions or prolapse noted, normal size  Urethra: no masses, non tender  Bladder: non tender, no fullness  Vagina: normal mucosa and rugae, no discharge.  Cervix: normal without lesion, no cervical motion tenderness, healthy, multiparous  Uterus: small, mobile, nontender.  Adnexa: non tender, without masses  Rectal: deferred  Ext=no clubbing or cyanosis, no swelling      Last lipid profile: 2022-high  Regular self breast exam:  Yes  Most recent mammogram:  N/A  History of abnormal mammogram:  N/A  Fit testing:  N/A  DEXA:  N/A        Assessment/Plan  1.)  Annual/well woman exam=CBC, CMP, lipids, TSH, " mammogram  2.)  PCOS  3.)  Questionable endometrial polyp on last ultrasound=pelvic ultrasound   4.)  HTN=fu with FP    The following topics were discussed or recommended   Discussed seat belt, helmet and sunscreen use  Vision screening   Mammogram   Calcium/Vitamin D supplement=Recommended 1000 mg of calcium daily and 800 IU of vitamin D.    30 minutes were spent on the date of the encounter doing chart review, history and exam, documentation, and further activities as noted above.        Argelia Garland, DO

## 2023-06-01 ENCOUNTER — TELEPHONE (OUTPATIENT)
Dept: OBGYN | Facility: CLINIC | Age: 38
End: 2023-06-01
Payer: COMMERCIAL

## 2023-06-01 DIAGNOSIS — Z00.00 ANNUAL PHYSICAL EXAM: Primary | ICD-10-CM

## 2023-06-01 NOTE — TELEPHONE ENCOUNTER
Order/Referral Request    Who is requesting: Patient    Orders being requested: Lab orders from physical    Reason service is needed/diagnosis: lab    When are orders needed by: asap    Has this been discussed with Provider: Yes    Does patient have a preference on a Group/Provider/Facility? n    Does patient have an appointment scheduled?: No    Where to send orders: Place orders within Epic    Could we send this information to you in [facility] or would you prefer to receive a phone call?:   Patient would prefer a phone call   Okay to leave a detailed message?: Yes at Cell number on file:    Telephone Information:   Mobile [phone]

## 2023-06-01 NOTE — TELEPHONE ENCOUNTER
"Patient had physical with GG on 05/25. Per OV note:   \"Annual/well woman exam=CBC, CMP, lipids, TSH, mammogram\"    Patient had CBC and TSH completed. Ordered with MD larson. Patient informed.   Nayeli Vivar CMA  "

## 2023-06-05 DIAGNOSIS — R03.0 ELEVATED BLOOD PRESSURE READING WITHOUT DIAGNOSIS OF HYPERTENSION: Primary | ICD-10-CM

## 2023-06-06 ENCOUNTER — LAB (OUTPATIENT)
Dept: LAB | Facility: CLINIC | Age: 38
End: 2023-06-06
Payer: COMMERCIAL

## 2023-06-06 ENCOUNTER — ALLIED HEALTH/NURSE VISIT (OUTPATIENT)
Dept: FAMILY MEDICINE | Facility: CLINIC | Age: 38
End: 2023-06-06
Attending: FAMILY MEDICINE
Payer: COMMERCIAL

## 2023-06-06 VITALS — SYSTOLIC BLOOD PRESSURE: 124 MMHG | DIASTOLIC BLOOD PRESSURE: 94 MMHG

## 2023-06-06 DIAGNOSIS — R03.0 ELEVATED BLOOD PRESSURE READING WITHOUT DIAGNOSIS OF HYPERTENSION: ICD-10-CM

## 2023-06-06 DIAGNOSIS — Z00.00 ANNUAL PHYSICAL EXAM: ICD-10-CM

## 2023-06-06 LAB
ALBUMIN SERPL BCG-MCNC: 3.9 G/DL (ref 3.5–5.2)
ALP SERPL-CCNC: 70 U/L (ref 35–104)
ALT SERPL W P-5'-P-CCNC: 20 U/L (ref 10–35)
ANION GAP SERPL CALCULATED.3IONS-SCNC: 10 MMOL/L (ref 7–15)
AST SERPL W P-5'-P-CCNC: 23 U/L (ref 10–35)
BILIRUB SERPL-MCNC: 0.5 MG/DL
BUN SERPL-MCNC: 9.8 MG/DL (ref 6–20)
CALCIUM SERPL-MCNC: 8.7 MG/DL (ref 8.6–10)
CHLORIDE SERPL-SCNC: 105 MMOL/L (ref 98–107)
CHOLEST SERPL-MCNC: 169 MG/DL
CREAT SERPL-MCNC: 0.82 MG/DL (ref 0.51–0.95)
DEPRECATED HCO3 PLAS-SCNC: 25 MMOL/L (ref 22–29)
GFR SERPL CREATININE-BSD FRML MDRD: >90 ML/MIN/1.73M2
GLUCOSE SERPL-MCNC: 105 MG/DL (ref 70–99)
HDLC SERPL-MCNC: 32 MG/DL
LDLC SERPL CALC-MCNC: 96 MG/DL
NONHDLC SERPL-MCNC: 137 MG/DL
POTASSIUM SERPL-SCNC: 4.1 MMOL/L (ref 3.4–5.3)
PROT SERPL-MCNC: 6.2 G/DL (ref 6.4–8.3)
SODIUM SERPL-SCNC: 140 MMOL/L (ref 136–145)
TRIGL SERPL-MCNC: 203 MG/DL

## 2023-06-06 PROCEDURE — 80053 COMPREHEN METABOLIC PANEL: CPT

## 2023-06-06 PROCEDURE — 80061 LIPID PANEL: CPT

## 2023-06-06 PROCEDURE — 99207 PR NO CHARGE NURSE ONLY: CPT

## 2023-06-06 PROCEDURE — 36415 COLL VENOUS BLD VENIPUNCTURE: CPT

## 2023-06-06 NOTE — PROGRESS NOTES
Cammy Patino is a 37 year old patient who comes in today for a Blood Pressure check.  Initial BP:  BP (!) 124/94   LMP 05/25/2023      Data Unavailable  Disposition: follow-up as previously indicated by provider and results routed to provider

## 2023-06-26 ENCOUNTER — MYC MEDICAL ADVICE (OUTPATIENT)
Dept: FAMILY MEDICINE | Facility: CLINIC | Age: 38
End: 2023-06-26
Payer: COMMERCIAL

## 2023-06-28 ENCOUNTER — ANCILLARY PROCEDURE (OUTPATIENT)
Dept: CARDIOLOGY | Facility: CLINIC | Age: 38
End: 2023-06-28
Attending: FAMILY MEDICINE
Payer: COMMERCIAL

## 2023-06-28 DIAGNOSIS — R03.0 ELEVATED BLOOD PRESSURE READING WITHOUT DIAGNOSIS OF HYPERTENSION: ICD-10-CM

## 2023-06-28 PROCEDURE — 93784 AMBL BP MNTR W/SOFTWARE: CPT | Performed by: INTERNAL MEDICINE

## 2023-07-05 ENCOUNTER — VIRTUAL VISIT (OUTPATIENT)
Dept: FAMILY MEDICINE | Facility: CLINIC | Age: 38
End: 2023-07-05
Payer: COMMERCIAL

## 2023-07-05 DIAGNOSIS — K30 GASTROINTESTINAL DISCOMFORT: Primary | ICD-10-CM

## 2023-07-05 DIAGNOSIS — F33.1 MAJOR DEPRESSIVE DISORDER, RECURRENT EPISODE, MODERATE (H): ICD-10-CM

## 2023-07-05 DIAGNOSIS — F41.9 ANXIETY: ICD-10-CM

## 2023-07-05 PROCEDURE — 99214 OFFICE O/P EST MOD 30 MIN: CPT | Mod: VID | Performed by: FAMILY MEDICINE

## 2023-07-05 RX ORDER — CITALOPRAM HYDROBROMIDE 20 MG/1
TABLET ORAL
Qty: 35 TABLET | Refills: 0 | Status: SHIPPED | OUTPATIENT
Start: 2023-07-05 | End: 2023-07-29

## 2023-07-05 ASSESSMENT — ENCOUNTER SYMPTOMS: NERVOUS/ANXIOUS: 1

## 2023-07-05 ASSESSMENT — ANXIETY QUESTIONNAIRES
6. BECOMING EASILY ANNOYED OR IRRITABLE: MORE THAN HALF THE DAYS
5. BEING SO RESTLESS THAT IT IS HARD TO SIT STILL: MORE THAN HALF THE DAYS
GAD7 TOTAL SCORE: 14
1. FEELING NERVOUS, ANXIOUS, OR ON EDGE: NEARLY EVERY DAY
2. NOT BEING ABLE TO STOP OR CONTROL WORRYING: MORE THAN HALF THE DAYS
GAD7 TOTAL SCORE: 14
IF YOU CHECKED OFF ANY PROBLEMS ON THIS QUESTIONNAIRE, HOW DIFFICULT HAVE THESE PROBLEMS MADE IT FOR YOU TO DO YOUR WORK, TAKE CARE OF THINGS AT HOME, OR GET ALONG WITH OTHER PEOPLE: SOMEWHAT DIFFICULT
3. WORRYING TOO MUCH ABOUT DIFFERENT THINGS: MORE THAN HALF THE DAYS
4. TROUBLE RELAXING: SEVERAL DAYS
7. FEELING AFRAID AS IF SOMETHING AWFUL MIGHT HAPPEN: MORE THAN HALF THE DAYS

## 2023-07-05 ASSESSMENT — PATIENT HEALTH QUESTIONNAIRE - PHQ9
10. IF YOU CHECKED OFF ANY PROBLEMS, HOW DIFFICULT HAVE THESE PROBLEMS MADE IT FOR YOU TO DO YOUR WORK, TAKE CARE OF THINGS AT HOME, OR GET ALONG WITH OTHER PEOPLE: SOMEWHAT DIFFICULT
SUM OF ALL RESPONSES TO PHQ QUESTIONS 1-9: 11
SUM OF ALL RESPONSES TO PHQ QUESTIONS 1-9: 11
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
## 2023-07-18 NOTE — RESULT ENCOUNTER NOTE
Ms. Patino,    -All of your labs are normal.  There were only a few rare instances when your BP was above 140 mmHg for the top or 90 mmHg for the bottom number.  Overall you averaged 118/79 mmHg which is textbook.  No need for medication at this point, but keep doing the lifestyle stuff we discussed at our last visit to facilitate some weight loss.      If you have further questions about the interpretation of your labs, labtestsonline.org is a good website to check out for further information.    Please contact the clinic if you have additional questions.  Thank you.    Sincerely,    Yeison Givens MD

## 2023-07-28 DIAGNOSIS — F33.1 MAJOR DEPRESSIVE DISORDER, RECURRENT EPISODE, MODERATE (H): ICD-10-CM

## 2023-07-28 DIAGNOSIS — F41.9 ANXIETY: ICD-10-CM

## 2023-07-28 NOTE — TELEPHONE ENCOUNTER
Routing refill request to provider for review/approval because:  Please review pharmacy message.

## 2023-07-29 RX ORDER — CITALOPRAM HYDROBROMIDE 20 MG/1
20 TABLET ORAL DAILY
Qty: 90 TABLET | Refills: 1 | Status: SHIPPED | OUTPATIENT
Start: 2023-07-29 | End: 2023-10-17

## 2023-07-29 NOTE — TELEPHONE ENCOUNTER
Chart reviewed.  Rx sent to pt's preferred pharmacy.       [id number] - [place], [place] - [address] [place] [address]  [id number] [place], [place] - [address] [place] [address]    Yeison Givens MD

## 2023-10-17 ENCOUNTER — E-VISIT (OUTPATIENT)
Dept: FAMILY MEDICINE | Facility: CLINIC | Age: 38
End: 2023-10-17
Payer: COMMERCIAL

## 2023-10-17 DIAGNOSIS — F33.1 MAJOR DEPRESSIVE DISORDER, RECURRENT EPISODE, MODERATE (H): ICD-10-CM

## 2023-10-17 DIAGNOSIS — F41.9 ANXIETY: ICD-10-CM

## 2023-10-17 PROCEDURE — 99421 OL DIG E/M SVC 5-10 MIN: CPT | Performed by: FAMILY MEDICINE

## 2023-10-17 RX ORDER — CITALOPRAM HYDROBROMIDE 20 MG/1
20 TABLET ORAL DAILY
Qty: 90 TABLET | Refills: 3 | Status: SHIPPED | OUTPATIENT
Start: 2023-10-17

## 2023-10-17 ASSESSMENT — ANXIETY QUESTIONNAIRES
GAD7 TOTAL SCORE: 4
4. TROUBLE RELAXING: SEVERAL DAYS
GAD7 TOTAL SCORE: 4
6. BECOMING EASILY ANNOYED OR IRRITABLE: NOT AT ALL
7. FEELING AFRAID AS IF SOMETHING AWFUL MIGHT HAPPEN: NOT AT ALL
3. WORRYING TOO MUCH ABOUT DIFFERENT THINGS: SEVERAL DAYS
5. BEING SO RESTLESS THAT IT IS HARD TO SIT STILL: NOT AT ALL
2. NOT BEING ABLE TO STOP OR CONTROL WORRYING: SEVERAL DAYS
1. FEELING NERVOUS, ANXIOUS, OR ON EDGE: SEVERAL DAYS

## 2023-10-17 ASSESSMENT — PATIENT HEALTH QUESTIONNAIRE - PHQ9
SUM OF ALL RESPONSES TO PHQ QUESTIONS 1-9: 3
10. IF YOU CHECKED OFF ANY PROBLEMS, HOW DIFFICULT HAVE THESE PROBLEMS MADE IT FOR YOU TO DO YOUR WORK, TAKE CARE OF THINGS AT HOME, OR GET ALONG WITH OTHER PEOPLE: SOMEWHAT DIFFICULT
SUM OF ALL RESPONSES TO PHQ QUESTIONS 1-9: 3

## 2023-10-17 NOTE — PATIENT INSTRUCTIONS
Glad you're feeling better Cammy and hopefully you're getting some sleep with a new puppy.  I've given you a year's worth of refills.  Reach out if you're interested in stopping your medication as we'll need to taper you off it to avoid withdrawal symptoms as you stop it.  Let me know of anything else you need.    BTW - would recommend a flu shot and COVID booster for you & Maulik.    Yeison Givens MD

## 2023-10-18 ASSESSMENT — PATIENT HEALTH QUESTIONNAIRE - PHQ9: SUM OF ALL RESPONSES TO PHQ QUESTIONS 1-9: 3

## 2023-10-18 ASSESSMENT — ANXIETY QUESTIONNAIRES: GAD7 TOTAL SCORE: 4

## 2024-02-04 ENCOUNTER — HEALTH MAINTENANCE LETTER (OUTPATIENT)
Age: 39
End: 2024-02-04

## 2024-04-25 ENCOUNTER — PATIENT OUTREACH (OUTPATIENT)
Dept: CARE COORDINATION | Facility: CLINIC | Age: 39
End: 2024-04-25
Payer: COMMERCIAL

## 2024-05-09 ENCOUNTER — PATIENT OUTREACH (OUTPATIENT)
Dept: CARE COORDINATION | Facility: CLINIC | Age: 39
End: 2024-05-09
Payer: COMMERCIAL

## 2024-09-01 ENCOUNTER — HEALTH MAINTENANCE LETTER (OUTPATIENT)
Age: 39
End: 2024-09-01

## 2024-11-21 ENCOUNTER — PATIENT OUTREACH (OUTPATIENT)
Dept: CARE COORDINATION | Facility: CLINIC | Age: 39
End: 2024-11-21
Payer: COMMERCIAL